# Patient Record
Sex: FEMALE | Race: WHITE | NOT HISPANIC OR LATINO | URBAN - METROPOLITAN AREA
[De-identification: names, ages, dates, MRNs, and addresses within clinical notes are randomized per-mention and may not be internally consistent; named-entity substitution may affect disease eponyms.]

---

## 2017-10-03 ENCOUNTER — ON CAMPUS - OUTPATIENT (OUTPATIENT)
Dept: URBAN - METROPOLITAN AREA HOSPITAL 77 | Facility: HOSPITAL | Age: 57
End: 2017-10-03
Payer: COMMERCIAL

## 2017-10-03 ENCOUNTER — ON CAMPUS - OUTPATIENT (OUTPATIENT)
Dept: URBAN - METROPOLITAN AREA HOSPITAL 77 | Facility: HOSPITAL | Age: 57
End: 2017-10-03

## 2017-10-03 DIAGNOSIS — R11.0 NAUSEA: ICD-10-CM

## 2017-10-03 DIAGNOSIS — K25.9 GASTRIC ULCER, UNSPECIFIED AS ACUTE OR CHRONIC, WITHOUT HEMO: ICD-10-CM

## 2017-10-03 DIAGNOSIS — R10.11 RIGHT UPPER QUADRANT PAIN: ICD-10-CM

## 2017-10-03 DIAGNOSIS — E55.9 VITAMIN D DEFICIENCY, UNSPECIFIED: ICD-10-CM

## 2017-10-03 DIAGNOSIS — K44.9 DIAPHRAGMATIC HERNIA WITHOUT OBSTRUCTION OR GANGRENE: ICD-10-CM

## 2017-10-03 DIAGNOSIS — R10.13 EPIGASTRIC PAIN: ICD-10-CM

## 2017-10-03 DIAGNOSIS — R13.10 DYSPHAGIA, UNSPECIFIED: ICD-10-CM

## 2017-10-03 PROCEDURE — 99212 OFFICE O/P EST SF 10 MIN: CPT | Performed by: NURSE PRACTITIONER

## 2017-10-03 PROCEDURE — 43235 EGD DIAGNOSTIC BRUSH WASH: CPT | Performed by: INTERNAL MEDICINE

## 2017-11-07 ENCOUNTER — OFFICE (OUTPATIENT)
Dept: URBAN - METROPOLITAN AREA CLINIC 64 | Facility: CLINIC | Age: 57
End: 2017-11-07

## 2017-11-07 VITALS
DIASTOLIC BLOOD PRESSURE: 80 MMHG | WEIGHT: 165 LBS | HEART RATE: 65 BPM | HEIGHT: 66 IN | SYSTOLIC BLOOD PRESSURE: 138 MMHG

## 2017-11-07 DIAGNOSIS — R10.13 EPIGASTRIC PAIN: ICD-10-CM

## 2017-11-07 PROCEDURE — 99213 OFFICE O/P EST LOW 20 MIN: CPT | Performed by: INTERNAL MEDICINE

## 2017-11-07 RX ORDER — SUCRALFATE 1 G/1
TABLET ORAL
Qty: 360 | Refills: 2 | Status: COMPLETED
Start: 2017-11-01 | End: 2017-11-07

## 2017-12-04 ENCOUNTER — ON CAMPUS - OUTPATIENT (OUTPATIENT)
Dept: URBAN - METROPOLITAN AREA HOSPITAL 77 | Facility: HOSPITAL | Age: 57
End: 2017-12-04
Payer: COMMERCIAL

## 2017-12-04 DIAGNOSIS — Z12.11 ENCOUNTER FOR SCREENING FOR MALIGNANT NEOPLASM OF COLON: ICD-10-CM

## 2017-12-04 DIAGNOSIS — K21.9 GASTRO-ESOPHAGEAL REFLUX DISEASE WITHOUT ESOPHAGITIS: ICD-10-CM

## 2017-12-04 DIAGNOSIS — K57.30 DIVERTICULOSIS OF LARGE INTESTINE WITHOUT PERFORATION OR ABS: ICD-10-CM

## 2017-12-04 DIAGNOSIS — K64.8 OTHER HEMORRHOIDS: ICD-10-CM

## 2017-12-04 PROCEDURE — 45378 DIAGNOSTIC COLONOSCOPY: CPT | Performed by: INTERNAL MEDICINE

## 2017-12-04 PROCEDURE — 43235 EGD DIAGNOSTIC BRUSH WASH: CPT | Performed by: INTERNAL MEDICINE

## 2022-01-11 PROCEDURE — U0004 COV-19 TEST NON-CDC HGH THRU: HCPCS | Performed by: NURSE PRACTITIONER

## 2022-01-15 ENCOUNTER — TELEPHONE (OUTPATIENT)
Dept: URGENT CARE | Facility: CLINIC | Age: 62
End: 2022-01-15

## 2024-01-09 ENCOUNTER — LAB (OUTPATIENT)
Dept: FAMILY MEDICINE CLINIC | Facility: CLINIC | Age: 64
End: 2024-01-09
Payer: COMMERCIAL

## 2024-01-09 ENCOUNTER — OFFICE VISIT (OUTPATIENT)
Dept: FAMILY MEDICINE CLINIC | Facility: CLINIC | Age: 64
End: 2024-01-09
Payer: COMMERCIAL

## 2024-01-09 VITALS
HEART RATE: 78 BPM | HEIGHT: 63 IN | DIASTOLIC BLOOD PRESSURE: 86 MMHG | RESPIRATION RATE: 16 BRPM | SYSTOLIC BLOOD PRESSURE: 122 MMHG | WEIGHT: 168.4 LBS | BODY MASS INDEX: 29.84 KG/M2 | OXYGEN SATURATION: 98 %

## 2024-01-09 DIAGNOSIS — E03.8 HYPOTHYROIDISM DUE TO HASHIMOTO'S THYROIDITIS: ICD-10-CM

## 2024-01-09 DIAGNOSIS — E03.8 HYPOTHYROIDISM DUE TO HASHIMOTO'S THYROIDITIS: Primary | ICD-10-CM

## 2024-01-09 DIAGNOSIS — E06.3 HYPOTHYROIDISM DUE TO HASHIMOTO'S THYROIDITIS: ICD-10-CM

## 2024-01-09 DIAGNOSIS — M81.0 AGE-RELATED OSTEOPOROSIS WITHOUT CURRENT PATHOLOGICAL FRACTURE: ICD-10-CM

## 2024-01-09 DIAGNOSIS — R73.03 PREDIABETES: ICD-10-CM

## 2024-01-09 DIAGNOSIS — K25.9 MULTIPLE GASTRIC ULCERS: ICD-10-CM

## 2024-01-09 DIAGNOSIS — E06.3 HYPOTHYROIDISM DUE TO HASHIMOTO'S THYROIDITIS: Primary | ICD-10-CM

## 2024-01-09 DIAGNOSIS — R03.0 ELEVATED BP WITHOUT DIAGNOSIS OF HYPERTENSION: ICD-10-CM

## 2024-01-09 LAB — TSH SERPL DL<=0.05 MIU/L-ACNC: 4.19 UIU/ML (ref 0.27–4.2)

## 2024-01-09 PROCEDURE — 99204 OFFICE O/P NEW MOD 45 MIN: CPT | Performed by: INTERNAL MEDICINE

## 2024-01-09 PROCEDURE — 36415 COLL VENOUS BLD VENIPUNCTURE: CPT

## 2024-01-09 PROCEDURE — 84443 ASSAY THYROID STIM HORMONE: CPT | Performed by: INTERNAL MEDICINE

## 2024-01-09 RX ORDER — LEVOTHYROXINE SODIUM 0.07 MG/1
75 TABLET ORAL DAILY
Qty: 30 TABLET | Refills: 2 | Status: SHIPPED | OUTPATIENT
Start: 2024-01-09

## 2024-01-09 RX ORDER — LORATADINE 10 MG/1
CAPSULE, LIQUID FILLED ORAL
COMMUNITY

## 2024-01-09 NOTE — PROGRESS NOTES
Chief Complaint  Establish Care    HPI:    Ary Farley presents to [facility]    Patient presenting to establish care with new PCP.     Hypothyroid (hashimoto's):   Initially diagnosed about 8 years ago. Currently on Levothyroxine 75 mcg daily. No recent TSH on file. No recent symptoms of hyper- or hypo-thyroidism.     Osteoporosis  Follows with Dr August. She has been getting Reclast infusions and has completed two infusion. She is due to go back in the next month or two. Patient rather not go back on Fosamax. No prior pathological fracture. Currently on Calcium and vitamin D supplementation.     Previously had elevated hemoglobin A1c that placed her in pre-diabetes region. Hemoglobin A1c reportedly came down with healthy diet and exercise. Most recent A1c was with [facility] which was 5.7.     History of COVID that was contracted during traveling to [facility] in June 2023 She was previously prescribed ivermectin while in [facility]. She feels that symptoms have resolved but did have severe symptoms with COVID.     Previous endoscopy with GI for which she completed treatment for ulcers. She was told ulcers was due to ibuprofen use. No recurrent episodes since changing diet and avoiding ibuprofen. Denies abdominal pain, diarrhea, constipation, hematochezia, melena, jaundice. Denies fever, chills, nausea, vomiting.     Blood pressure has overall good. Occasionally she will get a blood pressure in the 130's to 140's systolic. Not currently on medications. Denies chest pain, shortness of breath, orthopnea, PND, lower extremity edema, palpitations, tachycardia     Preventative:    Social: Works for [employer]     Diet and Exercise: Diet okay but exercise could be better    Alcohol, Tobacco, and Recreational Drug use: Rare alcohol.     Cancer screenings:  Colonoscopy: Most recent in 2017; Due in 2027  Mammogram: Follows with GYN and was found to have breast lump  Pap/HPV: Follows with  "GYN    DEXA: Follows with Dr August    Immunizations: Reportedly up to date      Review of Systems:  ROS negative unless otherwise noted in HPI above.    Past Medical History:   Diagnosis Date    Disease of thyroid gland     Hypothyroidism     Osteoporosis     Pre-diabetes          Current Outpatient Medications:     cholecalciferol (VITAMIN D3) 1.25 MG (86305 UT) capsule, VITAMIN D CAPS, Disp: , Rfl:     levothyroxine (SYNTHROID, LEVOTHROID) 75 MCG tablet, , Disp: , Rfl:     Loratadine 10 MG capsule, Take  by mouth., Disp: , Rfl:     meloxicam (MOBIC) 7.5 MG tablet, , Disp: , Rfl:     Social History     Socioeconomic History    Marital status:    Tobacco Use    Smoking status: Never    Smokeless tobacco: Never   Substance and Sexual Activity    Alcohol use: Never        Objective   Vital Signs:  /86   Pulse 78   Resp 16   Ht 160 cm (63\")   Wt 76.4 kg (168 lb 6.4 oz)   SpO2 98%   BMI 29.83 kg/m²   Estimated body mass index is 29.83 kg/m² as calculated from the following:    Height as of this encounter: 160 cm (63\").    Weight as of this encounter: 76.4 kg (168 lb 6.4 oz).    Physical Exam:  General: Well-appearing patient, no apparent distress  HEENT: No posterior pharynx erythema, no tonsillar erythema or exudates  Neck: No cervical lymphadenopathy  Cardiac: Regular rate and rhythm, normal S1/S2, no murmur, rubs or gallops, no lower extremity edema  Lungs: Clear to auscultation bilaterally, no crackles or wheezes  Abdomen: Soft, non-tender, no guarding or rebound tenderness, no hepatosplenomegaly  Skin: No significant rashes or lesions  MSK: Grossly normal tone and strength  Neuro: Alert and oriented x3, CN II-XII grossly intact  Psych: Appropriate mood and affect    Assessment and Plan:    (E03.8,  E06.3) Hypothyroidism due to Hashimoto's thyroiditis -   Assessment: Currently on levothyroxine 75 mcg daily and doing well.  Asymptomatic. Due for repeat TSH.  Plan:   - TSH Rfx On Abnormal To Free " T4  - Continue levothyroxine as prescribed  - Consider future levothyroxine dose adjustments based on TSH    (M81.0) Age-related osteoporosis without current pathological fracture  Assessment: Follows with Dr. August and has previously undergone Reclast infusions.  No previous pathological fracture.  Compliant with calcium, vitamin D, and exercise for  Plan:  - Continue calcium and vitamin D supplementation  - Encourage weightbearing exercise  - Follow-up with Dr. August as scheduled  - Reclast infusions per Dr. August    (R73.03) Prediabetes  Assessment: Most recent hemoglobin A1c 5.7 and has been improving with healthy diet and exercise.  Plan:  - Encourage continued healthy diet and exercise    (K25.9) Multiple gastric ulcers  Assessment: Previously noted on endoscopy that were treated with multiple medications per patient.  Patient has had resolution with avoiding NSAIDs and improving diet and exercise.  Plan:  - Encouraged healthy diet and exercise  - Avoid NSAIDs    (R03.0) Elevated BP without diagnosis of hypertension   Assessment: Blood pressure usually well-controlled with occasional high.  Not currently on antihypertensive medications.  Obtaining additional home readings before considering starting medications.  Discussed the importance of healthy diet and exercise.   Plan:  - Intermittently monitor home blood pressures and follow up if elevated  - Encourage healthy diet and exercise    Patient was given instructions and counseling regarding her condition or for health maintenance advice. Please see specific information pulled into the AVS if appropriate.       Dr Jose Rivera   Internal Medicine Physician  [facility]  [address], [address]  [place] [address]

## 2024-01-09 NOTE — PATIENT INSTRUCTIONS
Please stop at lab on second floor to have blood drawn    Medications:  Continue current medications as prescribed    Follow up with Dr. August as scheduled    Continue calcium and vitamin D as prescribed    Encourage healthy diet and exercise    Follow up in 3-6 months for preventative care visit

## 2024-04-05 RX ORDER — LEVOTHYROXINE SODIUM 0.07 MG/1
75 TABLET ORAL DAILY
Qty: 30 TABLET | Refills: 2 | Status: SHIPPED | OUTPATIENT
Start: 2024-04-05

## 2024-04-12 ENCOUNTER — OFFICE VISIT (OUTPATIENT)
Dept: FAMILY MEDICINE CLINIC | Facility: CLINIC | Age: 64
End: 2024-04-12
Payer: COMMERCIAL

## 2024-04-12 ENCOUNTER — LAB (OUTPATIENT)
Dept: FAMILY MEDICINE CLINIC | Facility: CLINIC | Age: 64
End: 2024-04-12
Payer: COMMERCIAL

## 2024-04-12 VITALS
BODY MASS INDEX: 30.62 KG/M2 | HEART RATE: 68 BPM | RESPIRATION RATE: 16 BRPM | OXYGEN SATURATION: 97 % | HEIGHT: 63 IN | WEIGHT: 172.8 LBS | DIASTOLIC BLOOD PRESSURE: 86 MMHG | SYSTOLIC BLOOD PRESSURE: 122 MMHG

## 2024-04-12 DIAGNOSIS — M81.0 AGE-RELATED OSTEOPOROSIS WITHOUT CURRENT PATHOLOGICAL FRACTURE: ICD-10-CM

## 2024-04-12 DIAGNOSIS — Z13.220 LIPID SCREENING: ICD-10-CM

## 2024-04-12 DIAGNOSIS — Z00.00 ENCOUNTER FOR SCREENING AND PREVENTATIVE CARE: ICD-10-CM

## 2024-04-12 DIAGNOSIS — Z13.1 DIABETES MELLITUS SCREENING: ICD-10-CM

## 2024-04-12 DIAGNOSIS — E06.3 HYPOTHYROIDISM DUE TO HASHIMOTO'S THYROIDITIS: ICD-10-CM

## 2024-04-12 DIAGNOSIS — Z00.00 PREVENTATIVE HEALTH CARE: Primary | ICD-10-CM

## 2024-04-12 DIAGNOSIS — Z13.0 SCREENING FOR DEFICIENCY ANEMIA: ICD-10-CM

## 2024-04-12 DIAGNOSIS — K59.00 CONSTIPATION, UNSPECIFIED CONSTIPATION TYPE: ICD-10-CM

## 2024-04-12 DIAGNOSIS — K25.9 MULTIPLE GASTRIC ULCERS: ICD-10-CM

## 2024-04-12 DIAGNOSIS — E03.8 HYPOTHYROIDISM DUE TO HASHIMOTO'S THYROIDITIS: ICD-10-CM

## 2024-04-12 LAB
ANION GAP SERPL CALCULATED.3IONS-SCNC: 10.8 MMOL/L (ref 5–15)
BASOPHILS # BLD AUTO: 0.06 10*3/MM3 (ref 0–0.2)
BASOPHILS NFR BLD AUTO: 1.3 % (ref 0–1.5)
BUN SERPL-MCNC: 23 MG/DL (ref 8–23)
BUN/CREAT SERPL: 26.4 (ref 7–25)
CALCIUM SPEC-SCNC: 9.2 MG/DL (ref 8.6–10.5)
CHLORIDE SERPL-SCNC: 106 MMOL/L (ref 98–107)
CHOLEST SERPL-MCNC: 214 MG/DL (ref 0–200)
CO2 SERPL-SCNC: 24.2 MMOL/L (ref 22–29)
CREAT SERPL-MCNC: 0.87 MG/DL (ref 0.57–1)
DEPRECATED RDW RBC AUTO: 39.3 FL (ref 37–54)
EGFRCR SERPLBLD CKD-EPI 2021: 75.4 ML/MIN/1.73
EOSINOPHIL # BLD AUTO: 0.25 10*3/MM3 (ref 0–0.4)
EOSINOPHIL NFR BLD AUTO: 5.6 % (ref 0.3–6.2)
ERYTHROCYTE [DISTWIDTH] IN BLOOD BY AUTOMATED COUNT: 12.3 % (ref 12.3–15.4)
GLUCOSE SERPL-MCNC: 101 MG/DL (ref 65–99)
HBA1C MFR BLD: 5.9 % (ref 4.8–5.6)
HCT VFR BLD AUTO: 41 % (ref 34–46.6)
HDLC SERPL-MCNC: 82 MG/DL (ref 40–60)
HGB BLD-MCNC: 13.5 G/DL (ref 12–15.9)
IMM GRANULOCYTES # BLD AUTO: 0.01 10*3/MM3 (ref 0–0.05)
IMM GRANULOCYTES NFR BLD AUTO: 0.2 % (ref 0–0.5)
LDLC SERPL CALC-MCNC: 124 MG/DL (ref 0–100)
LDLC/HDLC SERPL: 1.5 {RATIO}
LYMPHOCYTES # BLD AUTO: 1.3 10*3/MM3 (ref 0.7–3.1)
LYMPHOCYTES NFR BLD AUTO: 28.9 % (ref 19.6–45.3)
MCH RBC QN AUTO: 29 PG (ref 26.6–33)
MCHC RBC AUTO-ENTMCNC: 32.9 G/DL (ref 31.5–35.7)
MCV RBC AUTO: 88.2 FL (ref 79–97)
MONOCYTES # BLD AUTO: 0.35 10*3/MM3 (ref 0.1–0.9)
MONOCYTES NFR BLD AUTO: 7.8 % (ref 5–12)
NEUTROPHILS NFR BLD AUTO: 2.53 10*3/MM3 (ref 1.7–7)
NEUTROPHILS NFR BLD AUTO: 56.2 % (ref 42.7–76)
NRBC BLD AUTO-RTO: 0 /100 WBC (ref 0–0.2)
PLATELET # BLD AUTO: 268 10*3/MM3 (ref 140–450)
PMV BLD AUTO: 10.8 FL (ref 6–12)
POTASSIUM SERPL-SCNC: 4.4 MMOL/L (ref 3.5–5.2)
RBC # BLD AUTO: 4.65 10*6/MM3 (ref 3.77–5.28)
SODIUM SERPL-SCNC: 141 MMOL/L (ref 136–145)
TRIGL SERPL-MCNC: 45 MG/DL (ref 0–150)
VLDLC SERPL-MCNC: 8 MG/DL (ref 5–40)
WBC NRBC COR # BLD AUTO: 4.5 10*3/MM3 (ref 3.4–10.8)

## 2024-04-12 PROCEDURE — 99396 PREV VISIT EST AGE 40-64: CPT | Performed by: INTERNAL MEDICINE

## 2024-04-12 PROCEDURE — 85025 COMPLETE CBC W/AUTO DIFF WBC: CPT | Performed by: INTERNAL MEDICINE

## 2024-04-12 PROCEDURE — 36415 COLL VENOUS BLD VENIPUNCTURE: CPT

## 2024-04-12 PROCEDURE — 83036 HEMOGLOBIN GLYCOSYLATED A1C: CPT | Performed by: INTERNAL MEDICINE

## 2024-04-12 PROCEDURE — 80061 LIPID PANEL: CPT | Performed by: INTERNAL MEDICINE

## 2024-04-12 PROCEDURE — 80048 BASIC METABOLIC PNL TOTAL CA: CPT | Performed by: INTERNAL MEDICINE

## 2024-04-12 NOTE — PROGRESS NOTES
Chief Complaint  Annual Exam    HPI:    Ary Farley presents to [facility]    Patient presenting for annual physical. Patient overall doing well.      Hypothyroid (hashimoto's):   Initially diagnosed about 8 years ago. Currently on Levothyroxine 75 mcg daily with most recent TSH from 1/9/2024 normal.  Denies symptoms of hyper or hypothyroidism.     Osteoporosis  Follows with Dr August. She has been getting Reclast infusions and has completed two infusions. Currently also on Calcium and vitamin D supplementation.  Denies previous pathological fractures.     Previously had elevated hemoglobin A1c that placed her in pre-diabetes region. Hemoglobin A1c reportedly came down with healthy diet and exercise. Most recent A1c was with [facility] which was 5.7. Patient due for repeat for insurance purposes.     Previous endoscopy with GI for which she completed treatment for ulcers. She was told ulcers was due to ibuprofen use. No recurrent episodes since changing diet and avoiding ibuprofen.  Not currently on PPI.     Blood pressure has overall good. Occasionally she will get a blood pressure in the typically in the 110-120'/80's. Not currently on medications. Denies chest pain, shortness of breath, orthopnea, PND, lower extremity edema, palpitations, tachycardia      Preventative:     Social: Works for [employer]      Diet and Exercise: Overall could be better     Alcohol, Tobacco, and Recreational Drug use: Rare alcohol.      Cancer screenings:  Colonoscopy: Most recent in 2017; Due in 2027 Previously done at [facility].  Mammogram: Follows with GYN; up to date with mammogram and ultrasound was negative  Pap/HPV: Follows with GYN     DEXA: Follows with Dr August    Immunizations: Reportedly up to date      Review of Systems:  ROS negative unless otherwise noted in HPI above.    Past Medical History:   Diagnosis Date    Disease of thyroid gland     Hypothyroidism     Osteoporosis     Pre-diabetes   "        Current Outpatient Medications:     cholecalciferol (VITAMIN D3) 1.25 MG (27641 UT) capsule, VITAMIN D CAPS, Disp: , Rfl:     levothyroxine (SYNTHROID, LEVOTHROID) 75 MCG tablet, TAKE 1 TABLET BY MOUTH DAILY, Disp: 30 tablet, Rfl: 2    Loratadine 10 MG capsule, Take  by mouth., Disp: , Rfl:     Social History     Socioeconomic History    Marital status:    Tobacco Use    Smoking status: Never    Smokeless tobacco: Never   Substance and Sexual Activity    Alcohol use: Never        Objective   Vital Signs:  /86   Pulse 68   Resp 16   Ht 160 cm (63\")   Wt 78.4 kg (172 lb 12.8 oz)   SpO2 97%   BMI 30.61 kg/m²   Estimated body mass index is 30.61 kg/m² as calculated from the following:    Height as of this encounter: 160 cm (63\").    Weight as of this encounter: 78.4 kg (172 lb 12.8 oz).    Physical Exam:  General: Well-appearing patient, no apparent distress  HEENT: No posterior pharynx erythema, no tonsillar erythema or exudates  Cardiac: Regular rate and rhythm, normal S1/S2, no murmur, rubs or gallops, no lower extremity edema  Lungs: Clear to auscultation bilaterally, no crackles or wheezes  Abdomen: Soft, non-tender, no guarding or rebound tenderness, no hepatosplenomegaly  Skin: No significant rashes or lesions  MSK: Grossly normal tone and strength  Neuro: Alert and oriented x3, CN II-XII grossly intact  Psych: Appropriate mood and affect    Assessment and Plan:    (Z00.00) Preventative health care  Patient is a 63 year old who is overall doing well. Reviewed social and family history. Encouraged increased healthy diet and exercise and discussed importance to overall health. Up to date with age and gender appropriate cancer screenings, which were previously done in outside system. Discussed indicated vaccines based on age and comorbidities. No skin, mood concerns.  Plan:  - Encourage healthy diet and exercise  - Up date vaccines, if necessary; due for RSV  - Screening labs as " ordered  - Cancer screenings up-to-date    (Z13.220) Lipid screening - Plan: Lipid panel    (Z13.1) Diabetes mellitus screening - Plan: Hemoglobin A1c    (Z13.0) Screening for deficiency anemia - Plan: CBC & Differential    (Z00.00) Encounter for screening and preventative care - Plan: Basic metabolic panel    (E03.8,  E06.3) Hypothyroidism due to Hashimoto's thyroiditis  Assessment: Overall doing well on levothyroxine 75 mcg daily.  Up-to-date with TSH.  Plan:  - Continue levothyroxine    (K25.9) Multiple gastric ulcers  Assessment: Previously noted on endoscopy secondary to ibuprofen use.  No recent symptoms with discontinuation of NSAIDs.  No red flag symptoms.  Plan:  - Avoid NSAIDs  - Monitor for now    (M81.0) Age-related osteoporosis without current pathological fracture  Assessment: Follows with Dr. August, osteoporosis clinic.  Up-to-date with DEXA scans.  Plan:  - Follow up with Dr. August as scheduled    (K59.00) Constipation, unspecified constipation type   Assessment: Patient with intermittent left lower quadrant discomfort and previous CT scan that showed stool.  Exam unremarkable.  No red flag symptoms.  Lengthy discussion about constipation, symptoms, and potential treatments.  Plan:  - Stay well-hydrated  - MiraLAX as needed    Patient was given instructions and counseling regarding her condition or for health maintenance advice. Please see specific information pulled into the AVS if appropriate.       Dr Jose Rivera   Internal Medicine Physician  [facility]  [address], [address]  [place] [address]

## 2024-04-12 NOTE — PATIENT INSTRUCTIONS
Please stop at lab on second floor to have blood drawn    Due for RSV vaccine    Medications:  Continue medications as prescribed    Stay well hydrated, miralax half capful daily    Encourage healthy diet and exercise    Follow up with specialists as scheduled    Follow up annually or sooner if something arises

## 2024-06-04 ENCOUNTER — PATIENT MESSAGE (OUTPATIENT)
Dept: FAMILY MEDICINE CLINIC | Facility: CLINIC | Age: 64
End: 2024-06-04
Payer: COMMERCIAL

## 2024-06-04 RX ORDER — LEVOTHYROXINE SODIUM 0.07 MG/1
75 TABLET ORAL DAILY
Qty: 9 TABLET | Refills: 1 | Status: SHIPPED | OUTPATIENT
Start: 2024-06-04 | End: 2024-06-05 | Stop reason: SDUPTHER

## 2024-06-04 NOTE — TELEPHONE ENCOUNTER
From: Ary Farley  To: Jose Rivera  Sent: 6/4/2024 3:00 PM EDT  Subject: 90 day refill    Hi Dr Rivera I am needing a prescription refill but you said to text u and let u know that I need a 90 day refill. I have 1 more 30 day refill left but my insurance encourages 90 days. I may need to change to another pharmacy in the future but for now send it to the one on record which is [facility]. Thanks so much.

## 2024-06-05 ENCOUNTER — TELEPHONE (OUTPATIENT)
Dept: FAMILY MEDICINE CLINIC | Facility: CLINIC | Age: 64
End: 2024-06-05
Payer: COMMERCIAL

## 2024-06-05 RX ORDER — LEVOTHYROXINE SODIUM 0.07 MG/1
75 TABLET ORAL DAILY
Qty: 90 TABLET | Refills: 1 | Status: SHIPPED | OUTPATIENT
Start: 2024-06-05

## 2024-06-05 NOTE — TELEPHONE ENCOUNTER
Patient wanted a 90 day of Levothyroxine and Dr Rivera sent in #9 pills. Can you please send in a 90 day supply with 1 refill?

## 2024-10-02 ENCOUNTER — PATIENT MESSAGE (OUTPATIENT)
Dept: FAMILY MEDICINE CLINIC | Facility: CLINIC | Age: 64
End: 2024-10-02
Payer: COMMERCIAL

## 2024-10-03 RX ORDER — LEVOTHYROXINE SODIUM 75 UG/1
75 TABLET ORAL DAILY
Qty: 90 TABLET | Refills: 1 | Status: SHIPPED | OUTPATIENT
Start: 2024-10-03

## 2025-03-26 RX ORDER — LEVOTHYROXINE SODIUM 75 UG/1
75 TABLET ORAL DAILY
Qty: 90 TABLET | Refills: 1 | Status: SHIPPED | OUTPATIENT
Start: 2025-03-26

## 2025-06-25 ENCOUNTER — OFFICE VISIT (OUTPATIENT)
Dept: FAMILY MEDICINE CLINIC | Facility: CLINIC | Age: 65
End: 2025-06-25
Payer: COMMERCIAL

## 2025-06-25 ENCOUNTER — LAB (OUTPATIENT)
Dept: FAMILY MEDICINE CLINIC | Facility: CLINIC | Age: 65
End: 2025-06-25
Payer: COMMERCIAL

## 2025-06-25 VITALS
RESPIRATION RATE: 16 BRPM | DIASTOLIC BLOOD PRESSURE: 84 MMHG | HEIGHT: 63 IN | OXYGEN SATURATION: 99 % | BODY MASS INDEX: 30.48 KG/M2 | HEART RATE: 50 BPM | WEIGHT: 172 LBS | SYSTOLIC BLOOD PRESSURE: 108 MMHG

## 2025-06-25 DIAGNOSIS — Z11.59 NEED FOR HEPATITIS C SCREENING TEST: ICD-10-CM

## 2025-06-25 DIAGNOSIS — M81.0 AGE-RELATED OSTEOPOROSIS WITHOUT CURRENT PATHOLOGICAL FRACTURE: ICD-10-CM

## 2025-06-25 DIAGNOSIS — E06.3 HYPOTHYROIDISM DUE TO HASHIMOTO'S THYROIDITIS: ICD-10-CM

## 2025-06-25 DIAGNOSIS — Z00.00 PREVENTATIVE HEALTH CARE: Primary | ICD-10-CM

## 2025-06-25 DIAGNOSIS — R73.03 PREDIABETES: ICD-10-CM

## 2025-06-25 DIAGNOSIS — Z13.220 LIPID SCREENING: ICD-10-CM

## 2025-06-25 DIAGNOSIS — E78.2 MIXED HYPERLIPIDEMIA: ICD-10-CM

## 2025-06-25 DIAGNOSIS — K25.9 MULTIPLE GASTRIC ULCERS: ICD-10-CM

## 2025-06-25 LAB
CHOLEST SERPL-MCNC: 218 MG/DL (ref 0–200)
HDLC SERPL-MCNC: 83 MG/DL (ref 40–60)
LDLC SERPL CALC-MCNC: 127 MG/DL (ref 0–100)
LDLC/HDLC SERPL: 1.52 {RATIO}
TRIGL SERPL-MCNC: 45 MG/DL (ref 0–150)
TSH SERPL DL<=0.05 MIU/L-ACNC: 2.84 UIU/ML (ref 0.27–4.2)
VLDLC SERPL-MCNC: 8 MG/DL (ref 5–40)

## 2025-06-25 PROCEDURE — 36415 COLL VENOUS BLD VENIPUNCTURE: CPT

## 2025-06-25 PROCEDURE — 80061 LIPID PANEL: CPT | Performed by: INTERNAL MEDICINE

## 2025-06-25 PROCEDURE — 86803 HEPATITIS C AB TEST: CPT | Performed by: INTERNAL MEDICINE

## 2025-06-25 PROCEDURE — 83036 HEMOGLOBIN GLYCOSYLATED A1C: CPT | Performed by: INTERNAL MEDICINE

## 2025-06-25 PROCEDURE — 84443 ASSAY THYROID STIM HORMONE: CPT | Performed by: INTERNAL MEDICINE

## 2025-06-25 RX ORDER — MULTIVIT-MIN/IRON/FOLIC ACID/K 18-600-40
2000 CAPSULE ORAL DAILY
COMMUNITY

## 2025-06-25 RX ORDER — CALCIUM CARBONATE/VITAMIN D3 500 MG-10
TABLET,CHEWABLE ORAL
COMMUNITY

## 2025-06-25 NOTE — PROGRESS NOTES
Chief Complaint  No chief complaint on file.    HPI:    Ary Farley presents to [facility]    Patient is a 65-year-old female presenting for annual preventative care visit.    Hypothyroid (hashimoto's):   Currently on Levothyroxine 75 mcg daily with most recent TSH from 1/9/2024 normal.  Tolerating medication well without side effects.  Denies ongoing symptoms of hyper or hypothyroidism.    Hyperlipidemia  Cholesterol elevated on previous routine screening labs.  Not currently on statin.  Diet and exercise overall could be better.    Prediabetes  Most recent hemoglobin A1c 5.9 on 4/12/2024.  Diet and exercise overall could be better.  Blood pressure generally well-controlled on home readings.     Osteoporosis  Follows with Dr August, most recently 2/12/2025. Up-to-date with DEXA scan and currently on drug holiday in the absence of fractures after previously getting Reclast. She has been taking calcium and vitamin D along with weight bearing exercise.     Gastric ulcers  Previous endoscopy with GI for which she completed treatment for ulcers. She was told ulcers was due to ibuprofen use. No recurrent episodes since changing diet and avoiding ibuprofen.  Not currently on PPI. Denies abdominal pain, hematochezia, melena.     She did have an episodes of nausea of vomiting that occurred after eating a lot of sugars. Took zofran with resolution of symptoms.      Preventative:     Social: Works for [employer]      Diet and Exercise: Overall has been improving; trying to work out more frequently.      Alcohol, Tobacco, and Recreational Drug use: Rare alcohol.      Cancer screenings:  Colonoscopy: Most recent in 2017; Due in 2027 Previously done at [facility].  Mammogram: Follows with GYN; up to date with mammogram and ultrasound was negative  Pap/HPV: Follows with GYN     DEXA: Follows with Dr August.  Up-to-date with DEXA scan and currently on drug holiday in the absence of fractures after  "previously getting Reclast.    Immunizations: Potentially due for pneumococcal and COVID    Advance healthcare directive: Not on file    Review of Systems:  ROS negative unless otherwise noted in HPI above.    Past Medical History:   Diagnosis Date    Disease of thyroid gland     Hypothyroidism     Osteoporosis     Pre-diabetes          Current Outpatient Medications:     cholecalciferol (VITAMIN D3) 1.25 MG (06704 UT) capsule, VITAMIN D CAPS, Disp: , Rfl:     levothyroxine (SYNTHROID, LEVOTHROID) 75 MCG tablet, TAKE 1 TABLET BY MOUTH EVERY DAY, Disp: 90 tablet, Rfl: 1    Loratadine 10 MG capsule, Take  by mouth., Disp: , Rfl:     Social History     Socioeconomic History    Marital status:    Tobacco Use    Smoking status: Never    Smokeless tobacco: Never   Substance and Sexual Activity    Alcohol use: Never        Objective   Vital Signs:  There were no vitals taken for this visit.  Estimated body mass index is 30.61 kg/m² as calculated from the following:    Height as of 4/12/24: 160 cm (63\").    Weight as of 4/12/24: 78.4 kg (172 lb 12.8 oz).    Physical Exam:  General: Well-appearing patient, no apparent distress  HEENT: No posterior pharynx erythema, no tonsillar erythema or exudates, normal external auditory canals, TM normal without bulging or erythema  Cardiac: Regular rate and rhythm, normal S1/S2, no murmur, rubs or gallops, no lower extremity edema  Lungs: Clear to auscultation bilaterally, no crackles or wheezes  Abdomen: Soft, non-tender, no guarding or rebound tenderness, no hepatosplenomegaly  Skin: No significant rashes or lesions  MSK: Grossly normal tone and strength  Neuro: Alert and oriented x3, CN II-XII grossly intact  Psych: Appropriate mood and affect    Assessment and Plan:    (Z00.00) Preventative health care  Patient is a 65 year old female who is overall doing well. Reviewed social and family history. Encouraged increased healthy diet and exercise and discussed importance to " overall health.  Up-to-date with age and gender appropriate cancer screenings.  Receives mammograms and Pap smear through GYN office.  Discussed indicated vaccines based on age and comorbidities. No skin, mood concerns.  Plan:  - Encourage healthy diet and exercise  - Up date vaccines, if necessary  - Screening labs as ordered  -Encourage future advanced health care directive     (E06.3) Hypothyroidism due to Hashimoto's thyroiditis -   Assessment: Overall doing well on levothyroxine 75 mcg daily.  Due for repeat thyroid function testing.  No reported symptoms of hyper or hypothyroidism.  Plan:   - TSH Rfx On Abnormal To Free T4  - Continue levothyroxine as prescribed    (M81.0) Age-related osteoporosis without current pathological fracture  Assessment: Follows with Dr. August.  Patient shown improvement in bone density on most recent DEXA scan.  Currently on drug holiday.  Discussed the importance of continued calcium and vitamin D supplementation and weightbearing exercises which oncology.  Plan:  - Follow-up with Dr. August as scheduled  - Continue calcium, vitamin D supplementation and weightbearing exercise    (R73.03) Prediabetes -   Assessment: Previously elevated on routine screening labs.  Patient has been losing inches with improved diet and activity.  Discussed the importance of healthy diet and exercise.  Plan:   - Hemoglobin A1c  - Encourage healthy diet and activity    (K25.9) History of multiple gastric ulcers  Assessment: Previously secondary to NSAID use.  No recent evidence of ulcers.  Occasionally will get reflux for which she will take short course of PPI.  No dysphagia or dyne aphasia warrant repeat endoscopy.  Plan:  - Avoid potential triggers  - Avoid NSAIDs  - PPI if recurrent reflux    (E78.2) Mixed hyperlipidemia  Assessment: Diet controlled.  Not currently on statin.  Discussed importance of healthy diet and exercise.  Plan:  - Fasting lipid panel  - Consider statin based on lipids and  10-year ASCVD risk  - Discussed healthy diet and lifestyle     (Z11.59) Need for hepatitis C screening test - Plan: Hepatitis C antibody      Patient was given instructions and counseling regarding her condition or for health maintenance advice. Please see specific information pulled into the AVS if appropriate.       Dr Jose Rivera   Internal Medicine Physician  [facility]--[address], [address]  [place] [address]

## 2025-06-25 NOTE — PATIENT INSTRUCTIONS
Please stop at lab on second floor to have blood drawn    Potentially due for pneumococcal and COVID; can get at any local pharmacy    Medications:  Continue medications as prescribed    Encourage healthy diet and exercise    Follow up with Dr August as scheduled    Follow up annually or sooner if something arises

## 2025-06-26 LAB
HBA1C MFR BLD: 5.9 % (ref 4.8–5.6)
HCV AB SER QL: NORMAL